# Patient Record
Sex: MALE | Race: BLACK OR AFRICAN AMERICAN | Employment: PART TIME | URBAN - METROPOLITAN AREA
[De-identification: names, ages, dates, MRNs, and addresses within clinical notes are randomized per-mention and may not be internally consistent; named-entity substitution may affect disease eponyms.]

---

## 2018-09-18 ENCOUNTER — HOSPITAL ENCOUNTER (EMERGENCY)
Age: 53
Discharge: HOME | End: 2018-09-18
Attending: EMERGENCY MEDICINE
Payer: SELF-PAY

## 2018-09-18 VITALS
RESPIRATION RATE: 16 BRPM | OXYGEN SATURATION: 99 % | SYSTOLIC BLOOD PRESSURE: 134 MMHG | TEMPERATURE: 98.1 F | DIASTOLIC BLOOD PRESSURE: 89 MMHG | HEART RATE: 77 BPM

## 2018-09-18 DIAGNOSIS — Z20.2 EXPOSURE TO TRICHOMONAS: Primary | ICD-10-CM

## 2018-09-18 DIAGNOSIS — Z11.3 SCREEN FOR STD (SEXUALLY TRANSMITTED DISEASE): ICD-10-CM

## 2018-09-18 DIAGNOSIS — R21 RASH: ICD-10-CM

## 2018-09-18 LAB
APPEARANCE UR: CLEAR
BILIRUB UR QL: NEGATIVE
COLOR UR: YELLOW
GLUCOSE UR STRIP.AUTO-MCNC: NEGATIVE MG/DL
HGB UR QL STRIP: NEGATIVE
KETONES UR QL STRIP.AUTO: NEGATIVE MG/DL
LEUKOCYTE ESTERASE UR QL STRIP.AUTO: NEGATIVE
NITRITE UR QL STRIP.AUTO: NEGATIVE
PH UR STRIP: 6 [PH] (ref 5–8)
PROT UR STRIP-MCNC: NEGATIVE MG/DL
SP GR UR REFRACTOMETRY: 1.01 (ref 1–1.03)
UROBILINOGEN UR QL STRIP.AUTO: 1 EU/DL (ref 0.2–1)

## 2018-09-18 PROCEDURE — 87491 CHLMYD TRACH DNA AMP PROBE: CPT | Performed by: PHYSICIAN ASSISTANT

## 2018-09-18 PROCEDURE — 81003 URINALYSIS AUTO W/O SCOPE: CPT | Performed by: PHYSICIAN ASSISTANT

## 2018-09-18 PROCEDURE — 74011250637 HC RX REV CODE- 250/637: Performed by: PHYSICIAN ASSISTANT

## 2018-09-18 PROCEDURE — 99283 EMERGENCY DEPT VISIT LOW MDM: CPT

## 2018-09-18 RX ORDER — METRONIDAZOLE 250 MG/1
2000 TABLET ORAL
Status: COMPLETED | OUTPATIENT
Start: 2018-09-18 | End: 2018-09-18

## 2018-09-18 RX ADMIN — METRONIDAZOLE 2000 MG: 250 TABLET ORAL at 18:06

## 2018-09-18 NOTE — DISCHARGE INSTRUCTIONS
Exposure to Sexually Transmitted Infections: Care Instructions  Your Care Instructions  Sexually transmitted infections (STIs) are those diseases spread by sexual contact. There are at least 20 different STIs, including chlamydia, gonorrhea, syphilis, and human immunodeficiency virus (HIV), which causes AIDS. Bacteria-caused STIs can be treated and cured. STIs caused by viruses, such as HIV, can be treated but not cured. Some STIs can reduce a woman's chances of getting pregnant in the future. STIs are spread during sexual contact, such as vaginal intercourse and oral or anal sex. Follow-up care is a key part of your treatment and safety. Be sure to make and go to all appointments, and call your doctor if you are having problems. It's also a good idea to know your test results and keep a list of the medicines you take. How can you care for yourself at home? · Your doctor may have given you a shot of antibiotics. If your doctor prescribed antibiotic pills, take them as directed. Do not stop taking them just because you feel better. You need to take the full course of antibiotics. · Do not have sexual contact while you have symptoms of an STI or are being treated for an STI. · Tell your sex partner (or partners) that he or she will need treatment. · If you are a woman, do not douche. Douching changes the normal balance of bacteria in the vagina and may spread an infection up into your reproductive organs. To prevent exposure to STIs in the future  · Use latex condoms every time you have sex. Use them from the beginning to the end of sexual contact. · Talk to your partner before you have sex. Find out if he or she has or is at risk for any STI. Keep in mind that a person may be able to spread an STI even if he or she does not have symptoms. · Do not have sex if you are being treated for an STI. · Do not have sex with anyone who has symptoms of an STI, such as sores on the genitals or mouth.   · Having one sex partner (who does not have STIs and does not have sex with anyone else) is a good way to avoid STIs. When should you call for help? Call your doctor now or seek immediate medical care if:    · You have new pain in your belly or pelvis.     · You have symptoms of a urinary tract infection. These may include:  ¨ Pain or burning when you urinate. ¨ A frequent need to urinate without being able to pass much urine. ¨ Pain in the flank, which is just below the rib cage and above the waist on either side of the back. ¨ Blood in your urine. ¨ A fever.     · You have new or worsening pain or swelling in the scrotum.    Watch closely for changes in your health, and be sure to contact your doctor if:    · You have unusual vaginal bleeding.     · You have a discharge from the vagina or penis.     · You have any new symptoms, such as sores, bumps, rashes, blisters, or warts.     · You have itching, tingling, pain, or burning in the genital or anal area.     · You think you may have an STI. Where can you learn more? Go to http://elizabeth-flakita.info/. Enter Y268 in the search box to learn more about \"Exposure to Sexually Transmitted Infections: Care Instructions. \"  Current as of: November 27, 2017  Content Version: 11.7  © 6485-0414 [employer]. Care instructions adapted under license by [employer] (which disclaims liability or warranty for this information). If you have questions about a medical condition or this instruction, always ask your healthcare professional. [place] [address] any warranty or liability for your use of this information.

## 2018-09-18 NOTE — ED PROVIDER NOTES
EMERGENCY DEPARTMENT HISTORY AND PHYSICAL EXAM 
 
Date: 9/18/2018 Patient Name: Gui De La Cruz History of Presenting Illness Chief Complaint Patient presents with  Exposure to STD  Rash History Provided By: Patient Chief Complaint: Skin Problem Duration: 1-2 Months Timing:  Improving Location: Bilateral Arms Quality: N/A Severity: Mild Modifying Factors: Hydrocortisone alleviated some of the Sx  
Associated Symptoms: N/A Additional History (Context):  
Gui De La Cruz is a 45 y.o. male with no PMHx who presents to the emergency department C/O pruritic rash on the bilateral arms that improved with OTC steroid cream. Pt also C/O of being exposed to trichomonas by his partner who has not finished her Abx for recently diagnosed trichomonas. Pt states he was previously exposed and finished his original Abx treatment. Pt denies penile discharge, penile pain, dysuria, and any other sxs or complaints. No further concerns or complaints at this time. PCP: None Past History Past Medical History: No past medical history on file. Past Surgical History: No past surgical history on file. Family History: No family history on file. Social History: 
Social History Substance Use Topics  Smoking status: Not on file  Smokeless tobacco: Not on file  Alcohol use Not on file Allergies: 
No Known Allergies Review of Systems Review of Systems Genitourinary: Negative for discharge, dysuria and penile pain. Skin: Positive for rash (bilateral arms). All other systems reviewed and are negative. Physical Exam  
 
Vitals:  
 09/18/18 1739 09/18/18 1754 BP: (!) 136/103 134/89 Pulse: 80 77 Resp: 16 16 Temp: 98.1 °F (36.7 °C) SpO2: 100% 99% Physical Exam  
Constitutional: He appears well-developed and well-nourished. No distress. HENT:  
Head: Normocephalic and atraumatic.   
Right Ear: External ear normal.  
 Left Ear: External ear normal.  
Nose: Nose normal.  
Eyes: Conjunctivae are normal.  
Neck: Normal range of motion. Cardiovascular: Normal rate and regular rhythm. Pulmonary/Chest: Effort normal. No respiratory distress. Neurological: He is alert. Skin: Skin is warm and dry. He is not diaphoretic. Numerous excoriated papules noted to the BUE, some linear lesions which resemble burrows. Rash spares the finger web spaces. No signs of cellulitis. Psychiatric: He has a normal mood and affect. Vitals reviewed. Diagnostic Study Results Labs - No results found for this or any previous visit (from the past 12 hour(s)). Radiologic Studies - No orders to display CT Results  (Last 48 hours) None CXR Results  (Last 48 hours) None Medications given in the ED- Medications  
metroNIDAZOLE (FLAGYL) tablet 2,000 mg (not administered) Medical Decision Making I am the first provider for this patient. I reviewed the vital signs, available nursing notes, past medical history, past surgical history, family history and social history. Vital Signs-Reviewed the patient's vital signs. Records Reviewed: Nursing Notes Provider Notes (Medical Decision Making): Appears well and non-toxic, presenting with rash to BUE. No s/sx of cellulitis, resembles scabies, will tx as such. Will tx for expsoure to trich, re-test. Based on today's assessment, I feel the patient is stable for discharge to home with outpatient follow up. Return precautions and referrals provided. Procedures: 
Procedures Diagnosis and Disposition DISCHARGE NOTE: 
Karlos Encarnacion's  results have been reviewed with him. He has been counseled regarding his diagnosis, treatment, and plan. He verbally conveys understanding and agreement of the signs, symptoms, diagnosis, treatment and prognosis and additionally agrees to follow up as discussed. He also agrees with the care-plan and conveys that all of his questions have been answered. I have also provided discharge instructions for him that include: educational information regarding their diagnosis and treatment, and list of reasons why they would want to return to the ED prior to their follow-up appointment, should his condition change. He has been provided with education for proper emergency department utilization. CLINICAL IMPRESSION: 
 
1. Exposure to trichomonas 2. Screen for STD (sexually transmitted disease) 3. Rash PLAN: 
1. D/C Home 2. Current Discharge Medication List  
  
START taking these medications Details  
permethrin (NIX) 1 % lotion Apply half of tube to the body from the neck to the feet. Leave on for 8-14 hours, then wash lotion off. Repeat process in 1 week. Qty: 59 mL, Refills: 0  
  
  
 
3. Follow-up Information Follow up With Details Comments Contact [facility] EMERGENCY DEPT  As needed [address] 
[phone] Your Primary Care Physician Schedule an appointment as soon as possible for a visit in 1 day For follow-up [facility] - Nick Rangel Schedule an appointment as soon as possible for a visit in 1 day  [address]) [address] [phone] Scribe Attestation Slick Boyd acting as a scribe for and in the presence of [employer], [employer] September 18, 2018 at 5:34 PM 
    
Provider Attestation:     
I personally performed the services described in the documentation, reviewed the documentation, as recorded by the scribe in my presence, and it accurately and completely records my words and actions.  September 18, 2018 at 5:34 PM - Baljit Gilbert PA-C

## 2018-09-21 LAB
C TRACH RRNA SPEC QL NAA+PROBE: NEGATIVE
N GONORRHOEA RRNA SPEC QL NAA+PROBE: NEGATIVE
SPECIMEN SOURCE: NORMAL
T VAGINALIS RRNA SPEC QL NAA+PROBE: NEGATIVE

## 2019-05-28 ENCOUNTER — HOSPITAL ENCOUNTER (EMERGENCY)
Age: 54
Discharge: HOME | End: 2019-05-28
Attending: EMERGENCY MEDICINE
Payer: SELF-PAY

## 2019-05-28 VITALS
HEIGHT: 67 IN | OXYGEN SATURATION: 98 % | HEART RATE: 82 BPM | BODY MASS INDEX: 26.21 KG/M2 | RESPIRATION RATE: 16 BRPM | WEIGHT: 167 LBS | DIASTOLIC BLOOD PRESSURE: 100 MMHG | SYSTOLIC BLOOD PRESSURE: 147 MMHG | TEMPERATURE: 98.8 F

## 2019-05-28 DIAGNOSIS — A64 STD (MALE): Primary | ICD-10-CM

## 2019-05-28 PROCEDURE — 74011250636 HC RX REV CODE- 250/636: Performed by: PHYSICIAN ASSISTANT

## 2019-05-28 PROCEDURE — 96372 THER/PROPH/DIAG INJ SC/IM: CPT

## 2019-05-28 PROCEDURE — 99283 EMERGENCY DEPT VISIT LOW MDM: CPT

## 2019-05-28 PROCEDURE — 74011250637 HC RX REV CODE- 250/637: Performed by: PHYSICIAN ASSISTANT

## 2019-05-28 RX ORDER — METRONIDAZOLE 250 MG/1
2000 TABLET ORAL
Status: COMPLETED | OUTPATIENT
Start: 2019-05-28 | End: 2019-05-28

## 2019-05-28 RX ORDER — CEFTRIAXONE 250 MG/8ML
250 INJECTION, POWDER, FOR SOLUTION INTRAMUSCULAR; INTRAVENOUS
Status: COMPLETED | OUTPATIENT
Start: 2019-05-28 | End: 2019-05-28

## 2019-05-28 RX ORDER — AZITHROMYCIN 250 MG/1
1000 TABLET, FILM COATED ORAL
Status: COMPLETED | OUTPATIENT
Start: 2019-05-28 | End: 2019-05-28

## 2019-05-28 RX ADMIN — METRONIDAZOLE 2000 MG: 250 TABLET ORAL at 18:07

## 2019-05-28 RX ADMIN — CEFTRIAXONE SODIUM 250 MG: 250 INJECTION, POWDER, FOR SOLUTION INTRAMUSCULAR; INTRAVENOUS at 18:08

## 2019-05-28 RX ADMIN — AZITHROMYCIN MONOHYDRATE 1000 MG: 250 TABLET ORAL at 18:08

## 2019-05-28 NOTE — ED PROVIDER NOTES
EMERGENCY DEPARTMENT HISTORY AND PHYSICAL EXAM    Date: 5/28/2019  Patient Name: Rhonda Ely    History of Presenting Illness     Chief Complaint   Patient presents with    Exposure to STD         History Provided By: patient          Additional History (Context): Rhonda Ely here with possible exposure to Trichomonas/ no penile discharge, dysuria, hematuria, abdominal pain, pelvic pain or any other sx   PCP: None        Past History     Past Medical History:  History reviewed. No pertinent past medical history. Past Surgical History:  History reviewed. No pertinent surgical history. Family History:  History reviewed. No pertinent family history. Social History:  Social History     Tobacco Use    Smoking status: Never Smoker    Smokeless tobacco: Never Used   Substance Use Topics    Alcohol use: Not on file    Drug use: Not on file       Allergies:  No Known Allergies      Review of Systems     Review of Systems   Constitutional: Negative for chills and fever. HENT: negative for nasal congestion, sore throat, rhinorrhea  Eyes: Negative. Respiratory: Negative cough and negative for shortness of breath. Cardiovascular: Negative for chest pain and palpitations. Gastrointestinal: Negative for abdominal pain, constipation, diarrhea, nausea and vomiting. Genitourinary: Negative. Negative for difficulty urinating and flank pain. Musculoskeletal: Negative for back pain. Negative for gait problem and neck pain. Skin: Negative. Allergic/Immunologic: Negative. Neurological: Negative for dizziness, weakness, numbness and headaches. Psychiatric/Behavioral: Negative. All other systems reviewed and are negative.   All Other Systems Negative  Physical Exam     Vitals:    05/28/19 1743   BP: (!) 147/100   Pulse: 82   Resp: 16   Temp: 98.8 °F (37.1 °C)   SpO2: 98%   Weight: 75.8 kg (167 lb)   Height: 5' 7\" (1.702 m)     Physical Exam   Constitutional: He is oriented to person, place, and time. He appears well-developed and well-nourished. No distress. HENT:   Head: Normocephalic and atraumatic. Nose: Nose normal.   Eyes: Pupils are equal, round, and reactive to light. Conjunctivae and EOM are normal.   Neck: Normal range of motion. Neck supple. Cardiovascular: Normal rate and regular rhythm. Pulmonary/Chest: Effort normal and breath sounds normal. No respiratory distress. Abdominal: Soft. Genitourinary:   Genitourinary Comments: Deferred     Musculoskeletal: Normal range of motion. Neurological: He is alert and oriented to person, place, and time. No cranial nerve deficit. Coordination normal.   Skin: Skin is warm. No rash noted. He is not diaphoretic. Psychiatric: He has a normal mood and affect. His behavior is normal.   Nursing note and vitals reviewed. Diagnostic Study Results     Labs -   No results found for this or any previous visit (from the past 12 hour(s)). Radiologic Studies -   No orders to display     CT Results  (Last 48 hours)    None        CXR Results  (Last 48 hours)    None            Medical Decision Making   I am the first provider for this patient. I reviewed the vital signs, available nursing notes, past medical history, past surgical history, family history and social history. Vital Signs-Reviewed the patient's vital signs. Records Reviewed: Nursing notes, old medical records and any previous labs, imaging, visits, consultations pertinent to patient care    Procedures:  Procedures    Provider Notes (Medical Decision Making):   Pt is here for STD testing. Asx, VSS. No exam indicated. Treatment for Gc/chlamdyia/trich given in ED   Pt instructed to f/u with health department for further/complete testing. MED RECONCILIATION:  No current facility-administered medications for this encounter. No current outpatient medications on file. Disposition:  home    DISCHARGE NOTE:     Pt has been reexamined.   Patient has no new complaints, changes, or physical findings. Care plan outlined and precautions discussed. Discussed proper way to take medications. Discussed treatment plan, return precautions, symptomatic relief, and expected time to improvement. All questions answered. Patient is stable for discharge and outpatient management. Patient is ready to go home. Follow-up Information     Follow up With Specialties Details Why [facility] EMERGENCY DEPT Emergency Medicine   [address]  [phone]    None    None ([phone]) Patient stated that they have no PCP      [address]  [phone]          There are no discharge medications for this patient. Diagnosis     Clinical Impression:   1.  STD (male)

## 2019-05-28 NOTE — DISCHARGE INSTRUCTIONS
Patient Education        Safer Sex: Care Instructions  Your Care Instructions  Safer sex is a way to reduce your risk of getting an infection spread through sex. It can also help prevent pregnancy. Most infections that are spread through sex, also called sexually transmitted infections or STIs, can be cured. But some can decrease your chances of getting pregnant if they are not treated early. Others, such as herpes, have no cure. And some, such as HIV, can be deadly. Several products can help you practice safer sex and reduce your chance of STIs. One of the best is a condom. There are condoms for men and for women. The female condom is a tube of soft plastic with a closed end that is placed deep into the vagina. You can use a special rubber sheet (dental dam) for protection during oral sex. Latex gloves can keep your hands from touching blood, semen, or other body fluids that can carry infections. Remember that birth control methods such as diaphragms, IUDs, foams, and birth control pills do not stop you from getting STIs. Follow-up care is a key part of your treatment and safety. Be sure to make and go to all appointments, and call your doctor if you are having problems. It's also a good idea to know your test results and keep a list of the medicines you take. How can you care for yourself at home? · Think about getting shots to prevent hepatitis A and hepatitis B. These two diseases can be spread through sex. You also can get hepatitis A if you eat infected food. · Use condoms or female condoms each time and every time you have sex. · Learn the right way to use a male condom:  ? Condoms come in several sizes. Make sure you use the right size. A condom that is too small can break easily. A condom that is too big can slip off during sex. Use a new condom each time you have sex. ? Be careful not to poke a hole in the condom when you open the wrapper. ? Squeeze the tip of the condom to keep out air.   ? Pull down the loose skin (foreskin) from the head of an uncircumcised penis. ? While squeezing the tip of the condom, unroll it all the way down to the base of the firm penis. ? Never use petroleum jelly (such as Vaseline), grease, hand lotion, baby oil, or anything with oil in it. These products can make holes in the condom. ? After sex, hold the condom on your penis as you remove your penis from your partner. This will keep semen from spilling out of the condom. · Learn to use a female condom:  ? You can put in a female condom up to 8 hours before sex. ? Squeeze the smaller ring at the closed end and insert it deep into the vagina. The larger ring at the open end should stay outside the vagina. ? During sex, make sure the penis goes into the condom. ? After the penis is removed, close the open end of the condom by twisting it. Remove the condom. · Do not use a female condom and male condom at the same time. · Do not have sex with anyone who has symptoms of an STI, such as sores on the genitals or mouth. The herpes virus that causes cold sores can spread to and from the penis and vagina. · Do not drink a lot of alcohol or use drugs before sex. This can cause you to let down your guard and not practice safer sex. · Having one sex partner (who does not have STIs and does not have sex with anyone else) is a sure way to avoid STIs. · Talk to your partner before you have sex. Find out if he or she has or is at risk for any STI. Keep in mind that a person may be able to spread an STI even if he or she does not have symptoms. You and your partner may want to get an HIV test. You should get tested again 6 months later. Where can you learn more? Go to http://elizabeth-flakita.info/. Enter V915 in the search box to learn more about \"Safer Sex: Care Instructions. \"  Current as of: September 11, 2018  Content Version: 11.9  © 7366-6728 "[employer].  Care instructions adapted under license by [employer] (which disclaims liability or warranty for this information). If you have questions about a medical condition or this instruction, always ask your healthcare professional. [address] any warranty or liability for your use of this information.

## 2019-05-28 NOTE — ED NOTES
Assuming care of patient to discharge. Attempting to discharge, patient not in PIT or waiting room. Will call for patient again momentarily.